# Patient Record
Sex: FEMALE | Race: WHITE | NOT HISPANIC OR LATINO | Employment: STUDENT | URBAN - METROPOLITAN AREA
[De-identification: names, ages, dates, MRNs, and addresses within clinical notes are randomized per-mention and may not be internally consistent; named-entity substitution may affect disease eponyms.]

---

## 2022-12-25 ENCOUNTER — APPOINTMENT (OUTPATIENT)
Dept: GENERAL RADIOLOGY | Facility: HOSPITAL | Age: 52
End: 2022-12-25

## 2022-12-25 ENCOUNTER — HOSPITAL ENCOUNTER (EMERGENCY)
Facility: HOSPITAL | Age: 52
Discharge: HOME | End: 2022-12-25
Attending: EMERGENCY MEDICINE | Admitting: EMERGENCY MEDICINE

## 2022-12-25 VITALS
DIASTOLIC BLOOD PRESSURE: 84 MMHG | TEMPERATURE: 98.3 F | SYSTOLIC BLOOD PRESSURE: 119 MMHG | RESPIRATION RATE: 16 BRPM | BODY MASS INDEX: 24.52 KG/M2 | HEART RATE: 70 BPM | WEIGHT: 181 LBS | OXYGEN SATURATION: 99 % | HEIGHT: 72 IN

## 2022-12-25 DIAGNOSIS — S90.122A CONTUSION OF FOURTH TOE OF LEFT FOOT, INITIAL ENCOUNTER: ICD-10-CM

## 2022-12-25 DIAGNOSIS — S90.415A: ICD-10-CM

## 2022-12-25 DIAGNOSIS — S90.122A CONTUSION OF THIRD TOE OF LEFT FOOT, INITIAL ENCOUNTER: ICD-10-CM

## 2022-12-25 DIAGNOSIS — S90.122A CONTUSION OF SECOND TOE OF LEFT FOOT, INITIAL ENCOUNTER: Primary | ICD-10-CM

## 2022-12-25 PROCEDURE — 99282 EMERGENCY DEPT VISIT SF MDM: CPT

## 2022-12-25 PROCEDURE — 73660 X-RAY EXAM OF TOE(S): CPT

## 2022-12-25 NOTE — ED PROVIDER NOTES
EMERGENCY DEPARTMENT ENCOUNTER    Pt Name: Stephanie Gibbons  MRN: [id number]  Pt :   1970  Room Number:  25SF/25  Date of encounter:  2022  PCP: Eliseo Cobb MD  ED Provider: Tripp White PA-C    Historian: Patient      HPI:  Chief Complaint:         Context: Stephanie Gibbons is a 52 y.o. female who presents to the ED c/o pain to her left second third and fourth toes after dropping a stocking gaytan on her foot this morning.  She states her toes are slightly numb now and she is having little pain currently.  She has a small abrasion on the dorsal aspect of her third toe, no nail involvement.  No other foot pain.  She is ambulatory to bedside with a normal gait.  She denies other symptoms or injury at this time.      PAST MEDICAL HISTORY  History reviewed. No pertinent past medical history.      PAST SURGICAL HISTORY  History reviewed. No pertinent surgical history.      FAMILY HISTORY  History reviewed. No pertinent family history.      SOCIAL HISTORY  Social History     Socioeconomic History   • Marital status:          ALLERGIES  Patient has no known allergies.        REVIEW OF SYSTEMS  Review of Systems   Constitutional: Positive for activity change. Negative for appetite change.   HENT: Negative for congestion, rhinorrhea and sore throat.    Gastrointestinal: Negative for nausea and vomiting.   Musculoskeletal: Positive for arthralgias (Left second third and fourth toes). Negative for joint swelling and myalgias.   Skin: Positive for wound (Small abrasion to dorsal aspect of left third toe).   Neurological: Negative for dizziness, syncope and light-headedness.   Hematological: Does not bruise/bleed easily.   Psychiatric/Behavioral: Positive for self-injury.   All other systems reviewed and are negative.         All systems reviewed and negative except for those discussed in HPI.       PHYSICAL EXAM    I have reviewed the triage vital signs and nursing notes.    ED Triage Vitals  [12/25/22 1031]   Temp Heart Rate Resp BP SpO2   98.3 °F (36.8 °C) 70 16 119/84 99 %      Temp src Heart Rate Source Patient Position BP Location FiO2 (%)   Oral Monitor Sitting Left arm --       Physical Exam  GENERAL:   Appears in no acute distress.  Pleasant awake alert and oriented nontoxic-appearing afebrile hemodynamic stable, not in acute distress.  HENT: Nares patent.  EYES: No scleral icterus.  CV: Regular rhythm, regular rate.  RESPIRATORY: Normal effort.  No audible wheezes, rales or rhonchi.  ABDOMEN: Soft, nontender  MUSCULOSKELETAL: No deformities.  Small abrasion to dorsal aspect of left third toe.  No tenderness palpation.  No swelling or ecchymosis noted.  No nailbed involvement.  Sensation and capillary refill are intact distally.  Remaining bones of the foot nontender to palpation  NEURO: Alert, moves all extremities, follows commands.  SKIN: Warm, dry, no rash visualized.      LAB RESULTS  No results found for this or any previous visit (from the past 24 hour(s)).    If labs were ordered, I independently reviewed the results and considered them in treating the patient.        RADIOLOGY  XR Toe 2+ View Left    Result Date: 12/25/2022  DATE OF EXAM: 12/25/2022 10:42 AM  PROCEDURE: XR TOE 2+ VW LEFT-  INDICATIONS: injury  COMPARISON: No comparisons available.  TECHNIQUE: Three standard radiographic views were obtained of the left toes.  FINDINGS: History indicates  fell on foot, pain in second third and fourth digits.  The bony structures appear anatomically aligned and intact. Joint spaces are well-maintained. No fracture avulsion or foreign body is identified.       No visible fracture.  This report was finalized on 12/25/2022 11:50 AM by Dr. Romeo Dillard MD.          PROCEDURES    Procedures    No orders to display       MEDICATIONS GIVEN IN ER    Medications - No data to display      MEDICAL DECISION MAKING, PROGRESS, and CONSULTS    All labs have been independently reviewed by me.  All  radiology studies have been reviewed by me and the radiologist dictating the report.  All EKG's have been independently viewed and interpreted by me.      Discussion below represents my analysis of pertinent findings related to patient's condition, differential diagnosis, treatment plan and final disposition.      Differential diagnosis:            Orders placed during this visit:  Orders Placed This Encounter   Procedures   • XR Toe 2+ View Left         Additional orders considered but not ordered:      ED Course:    Consultants:                      AS OF 19:21 EST VITALS:    BP - 119/84  HR - 70  TEMP - 98.3 °F (36.8 °C) (Oral)  O2 SATS - 99%    No acute findings on x-ray.  Will discharge to home with symptomatic care, Tylenol/ibuprofen for pain, and return if any change or worsen.  She verbalizes understanding and is agreeable to plan.              DIAGNOSIS  Final diagnoses:   Contusion of second toe of left foot, initial encounter   Contusion of third toe of left foot, initial encounter   Contusion of fourth toe of left foot, initial encounter   Abrasion of third toe of left foot, initial encounter         DISPOSITION  DISCHARGE    Patient discharged in stable condition.    Reviewed implications of results, diagnosis, meds, responsibility to follow up, warning signs and symptoms of possible worsening, potential complications and reasons to return to ER.    Patient/Family voiced understanding of above instructions.    Discussed plan for discharge, as there is no emergent indication for admission.  Pt/family is agreeable and understands need for follow up and possible repeat testing.  Pt/family is aware that discharge does not mean that nothing is wrong but that it indicates no emergency is currently present that requires admission and they must continue care with follow-up as given below or with a physician of their choice.     FOLLOW-UP  Eliseo Cobb MD  [address] [facility]  [id number]  [phone]    Schedule an appointment as soon as possible for a visit   As needed         Medication List      No changes were made to your prescriptions during this visit.             Please note that portions of this document were completed with voice recognition software.      Tripp White PA-C  12/25/22 [id number]

## 2022-12-25 NOTE — DISCHARGE INSTRUCTIONS
Keep abrasion clean and dry, apply Band-Aid if necessary, otherwise leave open to the air clean and dry.  Apply cool compresses today every few hours for 10 to 15 minutes.  Keep foot elevated today and limit your walking and standing.  If symptoms have not significantly improved in 1 week, recommend repeat x-ray to rule out occult fracture.  Return to the emergency department immediately if any change or worsening of symptoms.

## 2023-03-30 ENCOUNTER — TRANSCRIBE ORDERS (OUTPATIENT)
Dept: ADMINISTRATIVE | Facility: HOSPITAL | Age: 53
End: 2023-03-30
Payer: COMMERCIAL

## 2023-03-30 DIAGNOSIS — N64.4 BREAST PAIN, RIGHT: Primary | ICD-10-CM

## 2023-04-27 ENCOUNTER — HOSPITAL ENCOUNTER (OUTPATIENT)
Dept: ULTRASOUND IMAGING | Facility: HOSPITAL | Age: 53
Discharge: HOME | End: 2023-04-27
Payer: COMMERCIAL

## 2023-04-27 ENCOUNTER — HOSPITAL ENCOUNTER (OUTPATIENT)
Dept: MAMMOGRAPHY | Facility: HOSPITAL | Age: 53
Discharge: HOME | End: 2023-04-27
Payer: COMMERCIAL

## 2023-04-27 DIAGNOSIS — N64.4 BREAST PAIN, RIGHT: ICD-10-CM

## 2023-04-27 PROCEDURE — 77066 DX MAMMO INCL CAD BI: CPT

## 2023-04-27 PROCEDURE — G0279 TOMOSYNTHESIS, MAMMO: HCPCS

## 2023-04-27 PROCEDURE — 76642 ULTRASOUND BREAST LIMITED: CPT
